# Patient Record
Sex: FEMALE
[De-identification: names, ages, dates, MRNs, and addresses within clinical notes are randomized per-mention and may not be internally consistent; named-entity substitution may affect disease eponyms.]

---

## 2022-12-13 PROBLEM — Z00.00 ENCOUNTER FOR PREVENTIVE HEALTH EXAMINATION: Status: ACTIVE | Noted: 2022-12-13

## 2022-12-16 ENCOUNTER — APPOINTMENT (OUTPATIENT)
Dept: ORTHOPEDIC SURGERY | Facility: CLINIC | Age: 67
End: 2022-12-16

## 2022-12-16 VITALS — BODY MASS INDEX: 36.29 KG/M2 | WEIGHT: 245 LBS | HEIGHT: 69 IN

## 2022-12-16 DIAGNOSIS — Z86.69 PERSONAL HISTORY OF OTHER DISEASES OF THE NERVOUS SYSTEM AND SENSE ORGANS: ICD-10-CM

## 2022-12-16 DIAGNOSIS — Z86.39 PERSONAL HISTORY OF OTHER ENDOCRINE, NUTRITIONAL AND METABOLIC DISEASE: ICD-10-CM

## 2022-12-16 DIAGNOSIS — Z86.79 PERSONAL HISTORY OF OTHER DISEASES OF THE CIRCULATORY SYSTEM: ICD-10-CM

## 2022-12-16 PROCEDURE — 73010 X-RAY EXAM OF SHOULDER BLADE: CPT | Mod: LT

## 2022-12-16 PROCEDURE — 99204 OFFICE O/P NEW MOD 45 MIN: CPT

## 2022-12-16 PROCEDURE — 73030 X-RAY EXAM OF SHOULDER: CPT | Mod: LT

## 2022-12-16 PROCEDURE — 73060 X-RAY EXAM OF HUMERUS: CPT | Mod: LT

## 2022-12-16 RX ORDER — METFORMIN HYDROCHLORIDE 625 MG/1
TABLET ORAL
Refills: 0 | Status: ACTIVE | COMMUNITY

## 2022-12-16 RX ORDER — SEMAGLUTIDE 1.34 MG/ML
INJECTION, SOLUTION SUBCUTANEOUS
Refills: 0 | Status: ACTIVE | COMMUNITY

## 2022-12-16 RX ORDER — AMLODIPINE BESYLATE 5 MG/1
TABLET ORAL
Refills: 0 | Status: ACTIVE | COMMUNITY

## 2022-12-16 RX ORDER — SERTRALINE HYDROCHLORIDE 25 MG/1
TABLET, FILM COATED ORAL
Refills: 0 | Status: ACTIVE | COMMUNITY

## 2022-12-16 RX ORDER — HYDROCHLOROTHIAZIDE 12.5 MG/1
TABLET ORAL
Refills: 0 | Status: ACTIVE | COMMUNITY

## 2022-12-16 RX ORDER — BUPROPION HYDROCHLORIDE 100 MG/1
TABLET, FILM COATED ORAL
Refills: 0 | Status: ACTIVE | COMMUNITY

## 2022-12-16 RX ORDER — LEVOTHYROXINE SODIUM 0.17 MG/1
TABLET ORAL
Refills: 0 | Status: ACTIVE | COMMUNITY

## 2022-12-16 NOTE — IMAGING
[Left] : left shoulder [FreeTextEntry1] : There are GT changes.  There are AC cysts. [FreeTextEntry4] : There are no keith changes at the distal humerus. [FreeTextEntry5] : There is a Type I acromion.

## 2022-12-16 NOTE — DATA REVIEWED
[FreeTextEntry1] : Left Shoulder MRI 10/25/2022.  There is thinning of the posterosuperior cuff.  An anchor is suggested.  The muscle is sufficient.  The biceps has minor changes.

## 2022-12-16 NOTE — REASON FOR VISIT
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  There was surgery which included a rotator cuff repair and biceps in 2021 with Dr. Sosa.  She does not have the op report.  She never felt she recovered fully.  She has had recent treated by Dr. Colin Mora.  She is in her 6 week of PT, but there is still pain.  She had one injection that has helped her mobility some.  She still has pain.  She can't sleep.  She takes Aleve, but not consistently.  No numbness.  Reaching behind is painful.  She was told there are surgical options with Dr. Mora.  She is here today for another opinion.

## 2022-12-16 NOTE — ASSESSMENT
[FreeTextEntry1] : We reviewed the findings and her options.\par Her history was discussed.\par Her questions were answered.\par There are surgical options.\par She will obtain the op report.\par She will f/u with her partner to discuss surgery better.

## 2022-12-16 NOTE — HISTORY OF PRESENT ILLNESS
[8] : 8 [0] : 0 [Dull/Aching] : dull/aching [Meds] : meds [] : no [FreeTextEntry5] : pt had surgery to her left shoulder last December, states that her bicep area always bothered her after the surgery. pt states she has issues with lifting the arm [FreeTextEntry1] : left shoulder/left bicep [FreeTextEntry7] : into her bicep  [FreeTextEntry9] : meloxicam  [de-identified] : movement  [de-identified] :   [de-identified] : 12/01/2021 [de-identified] : yesterday  [de-identified] : MRI

## 2022-12-16 NOTE — PHYSICAL EXAM
[Left] : left shoulder [Sitting] : sitting [Mild] : mild [4 ___] : forward flexion 4[unfilled]/5 [5___] : external rotation 5[unfilled]/5 [Right] : right shoulder [] : no sensory deficits [FreeTextEntry9] : IR is to T12. [TWNoteComboBox4] : passive forward flexion 165 degrees [de-identified] : external rotation 60 degrees

## 2022-12-19 ENCOUNTER — APPOINTMENT (OUTPATIENT)
Dept: ORTHOPEDIC SURGERY | Facility: CLINIC | Age: 67
End: 2022-12-19

## 2022-12-19 VITALS — BODY MASS INDEX: 36.29 KG/M2 | HEIGHT: 69 IN | WEIGHT: 245 LBS

## 2022-12-19 PROCEDURE — 20611 DRAIN/INJ JOINT/BURSA W/US: CPT | Mod: LT

## 2022-12-19 PROCEDURE — 99214 OFFICE O/P EST MOD 30 MIN: CPT | Mod: 25

## 2022-12-19 NOTE — PHYSICAL EXAM
[Left] : left shoulder [Sitting] : sitting [Mild] : mild [4 ___] : forward flexion 4[unfilled]/5 [5___] : external rotation 5[unfilled]/5 [Right] : right shoulder [] : no sensory deficits [FreeTextEntry8] : AC > GT tenderness. [de-identified] : [place]'s is equivocal.  [FreeTextEntry9] : IR is to T12. [TWNoteComboBox4] : passive forward flexion 165 degrees [de-identified] : external rotation 60 degrees

## 2022-12-19 NOTE — ASSESSMENT
[FreeTextEntry1] : We discussed her course\par She is a surgical candidate.\par She does not want to consider this option.\par Nor does she want to go to PT.\par She inquired about one more injection.\par This would be SA/GH, tho an AC injection could be considered as well.\par She understands any surgery could be delayed.\par Questions answered.\par \par Patient seen by Branden Parker M.D.\par Entered by Sakina Velazquez acting as scribe. \par \par Procedure Name: Large Joint Injection / Aspiration: Depomedrol, Lidocaine and Guidance Ultrasound\par \par Large Joint Injection was performed because of pain and inflammation.\par Depomedrol: An injection of Depomedrol 40 mg , 2 cc.\par Lidocaine: An injection of Lidocaine 1 mg , 13 cc.\par \par Medication was injected in the left subacromial space and glenohumeral joint. Patient has tried OTC's including aspirin, Ibuprofen, Aleve etc or prescription NSAIDS, and/or exercises at home and/ or physical therapy without satisfactory response. After verbal consent using sterile preparation and technique. The risks, benefits, and alternatives to cortisone injection were explained in full to the patient. Risks outlined include but are not limited to infection, sepsis, bleeding, scarring, skin discoloration, temporary increase in pain, syncopal episode, failure to resolve symptoms, allergic reaction, symptom recurrence, and elevation of blood sugar in diabetics. Patient understood the risks. All questions were answered. After discussion of options, patient requested an injection. Oral informed consent was obtained and sterile prep was done of the injection site. Sterile technique was utilized for the procedure including the preparation of the solutions used for the injection. Patient tolerated the procedure well. Advised to ice the injection site this evening. Prep with betadine locally to site. Sterile technique used. Patient tolerated procedure well. Post Procedure Instructions: Patient was advised to call if redness, pain, or fever occur and apply ice for 15 min. out of every hour for the next 12-24 hours as tolerated. Patient was advised to rest the joint(s) for 3 days. Ultrasound Guidance was used for the following reasons: for precise injection in area of tear. Visualization of the needle and placement of injection was performed without complication.\par

## 2022-12-19 NOTE — HISTORY OF PRESENT ILLNESS
[5] : 5 [0] : 0 [de-identified] : pt is here today for a follow up for her left shoulder. pt states her pain is the same as last visit [FreeTextEntry1] : left shoulder [de-identified] : advil or aleve as needed

## 2022-12-19 NOTE — REASON FOR VISIT
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  There was surgery which included a speed fix technique to one 4/75 Swivelock with 3 sutures, for a repair of a near FT bursal supraspinatus tear, along with a biceps tenotomy in 12/2/2021 with Dr. Sosa.  She does not have the op report.  She never felt she recovered fully.  She has had recent treated by Dr. Colin Mora.  She is in her 6 week of PT, but there is still pain.  She had one injection that has helped her mobility some.  She still has pain.  She can't sleep.  She takes Aleve, but not consistently.  No numbness.  Reaching behind is painful.  She was told there are surgical options with Dr. Mora.  She is here today for another opinion.

## 2023-02-24 ENCOUNTER — APPOINTMENT (OUTPATIENT)
Dept: ORTHOPEDIC SURGERY | Facility: CLINIC | Age: 68
End: 2023-02-24
Payer: MEDICARE

## 2023-02-24 VITALS — WEIGHT: 245 LBS | HEIGHT: 69 IN | BODY MASS INDEX: 36.29 KG/M2

## 2023-02-24 DIAGNOSIS — M75.42 IMPINGEMENT SYNDROME OF LEFT SHOULDER: ICD-10-CM

## 2023-02-24 DIAGNOSIS — Z98.890 OTHER SPECIFIED POSTPROCEDURAL STATES: ICD-10-CM

## 2023-02-24 DIAGNOSIS — M75.22 BICIPITAL TENDINITIS, LEFT SHOULDER: ICD-10-CM

## 2023-02-24 PROCEDURE — 20611 DRAIN/INJ JOINT/BURSA W/US: CPT

## 2023-02-24 PROCEDURE — 99214 OFFICE O/P EST MOD 30 MIN: CPT | Mod: 25

## 2023-02-24 NOTE — PHYSICAL EXAM
[Left] : left shoulder [Sitting] : sitting [Mild] : mild [4 ___] : forward flexion 4[unfilled]/5 [5___] : external rotation 5[unfilled]/5 [Right] : right shoulder [] : no sensory deficits [FreeTextEntry8] : AC > GT tenderness. [de-identified] : [place]'s is equivocal.  [FreeTextEntry9] : IR is to T12. [TWNoteComboBox4] : passive forward flexion 165 degrees [de-identified] : external rotation 60 degrees

## 2023-02-24 NOTE — ASSESSMENT
[FreeTextEntry1] : Since complaints today are biceps, not ac or subacromial\par Bicipital injection was discussed. \par I do not think there are good surgical options for the biceps. \par Plan for left bicipital groove steroid injection. \par Questions addressed\par \par Procedure Name: Large Joint Injection / Aspiration: Depomedrol, Lidocaine and Guidance Ultrasound\par \par Large Joint Injection was performed because of pain and inflammation.\par Depomedrol: An injection of Depomedrol 40 mg , 2 cc.\par Lidocaine: An injection of Lidocaine 1 mg , 8 cc.\par \par Medication was injected in the left bicipital groove. Patient has tried OTC's including aspirin, Ibuprofen, Aleve etc or prescription NSAIDS, and/or exercises at home and/ or physical therapy without satisfactory response. The risks, benefits, and alternatives to steroid injection were explained in full to the patient. Risks outlined include but are not limited to infection, sepsis, bleeding, scarring, skin discoloration, temporary increase in pain, syncopal episode, failure to resolve symptoms, allergic reaction, symptom recurrence, and elevation of blood sugar in diabetics. Patient understood the risks. All questions were answered. After discussion, patient requested an injection. Oral informed consent was obtained.  Sterile preparation with betadine and aseptic technique was utilized for the procedure, including the preparation of the solutions used for the injection. Patient tolerated the procedure well.  \par Post Procedure Instructions: Patient was advised to call if redness, pain, or fever occur and apply ice for 15 min. out of every hour for the next 12-24 hours as tolerated. Patient was advised to rest the joint(s) for 3 days.  Advised to ice the injection site this evening.\par Ultrasound Guidance was used for the following reasons: for precise injection in area of tear. Visualization of the needle and placement of injection was performed without complication.\par

## 2023-02-24 NOTE — REASON FOR VISIT
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  There was surgery which included a speed fix technique to one 4/75 Swivelock with 3 sutures, for a repair of a near FT bursal supraspinatus tear, along with a biceps tenotomy in 12/2/2021 with Dr. Sosa.  She does not have the op report.  She never felt she recovered fully.  She has had recent treated by Dr. Colin Mora.  She is in her 6 week of PT, but there is still pain.  She had one injection that has helped her mobility some.  She still has pain.  She can't sleep.  She takes Aleve, but not consistently.  No numbness.  Reaching behind is painful.  She was told there are surgical options with Dr. Mora.  She is here today for another opinion. The steroid injection left SA/ Dec 19th helped for two week at most, now 0% relief. Takes meloxicam as needed.

## 2023-02-24 NOTE — HISTORY OF PRESENT ILLNESS
[5] : 5 [0] : 0 [de-identified] : pt is here today for a follow up for her left shoulder. pt states her pain is similar to last visit, overhead motions are painful  [FreeTextEntry1] : left shoulder  [de-identified] : meloxicam used as needed